# Patient Record
Sex: FEMALE | Race: OTHER | Employment: FULL TIME | URBAN - METROPOLITAN AREA
[De-identification: names, ages, dates, MRNs, and addresses within clinical notes are randomized per-mention and may not be internally consistent; named-entity substitution may affect disease eponyms.]

---

## 2021-07-20 PROBLEM — G56.02 CARPAL TUNNEL SYNDROME OF LEFT WRIST: Status: ACTIVE | Noted: 2021-07-20

## 2021-07-22 ENCOUNTER — ORDER TRANSCRIPTION (OUTPATIENT)
Dept: ADMINISTRATIVE | Facility: HOSPITAL | Age: 41
End: 2021-07-22

## 2021-07-22 DIAGNOSIS — G56.02 CARPAL TUNNEL SYNDROME OF LEFT WRIST: Primary | ICD-10-CM

## 2021-08-09 ENCOUNTER — PROCEDURE VISIT (OUTPATIENT)
Dept: NEUROLOGY | Facility: CLINIC | Age: 41
End: 2021-08-09
Payer: COMMERCIAL

## 2021-08-09 PROCEDURE — 95886 MUSC TEST DONE W/N TEST COMP: CPT | Performed by: OTHER

## 2021-08-09 PROCEDURE — 95909 NRV CNDJ TST 5-6 STUDIES: CPT | Performed by: OTHER

## 2021-08-09 NOTE — PROGRESS NOTES
[address] [place] [address]  Phone: [phone]  Fax: [phone] REPORT          Patient: Sienna Patel Hand Dominance:  right   Patient ID: [id number] Referring Dr:  Shreya Varma needle EMG examination was performed in 6 muscles. It was normal in 4 muscle(s): L. Deltoid, L. Biceps brachii, L. Triceps brachii, L. Cervical paraspinals.  The study was abnormal in 2 muscle(s), with the following distribution:  • Abnormal spontaneous/ins II 2.03 2.71 64.9 99.2 Mid palm - Dig II 7 34      Wrist Dig II 2.14 2.76 65.6 98.5 Wrist - Dig II 13 61       EMG Summary Table     Spontaneous MUAP Recruitment   Muscle Nerve Roots IA Fib PSW Fasc H.F. Comments Amp Dur. PPP Pattern   L.  Deltoid Axillary

## 2023-03-14 ENCOUNTER — EXTERNAL RECORD (OUTPATIENT)
Dept: HEALTH INFORMATION MANAGEMENT | Facility: OTHER | Age: 43
End: 2023-03-14

## 2023-03-22 ENCOUNTER — TELEPHONE (OUTPATIENT)
Dept: NEUROLOGY | Age: 43
End: 2023-03-22

## 2023-03-24 ENCOUNTER — OFFICE VISIT (OUTPATIENT)
Dept: NEUROLOGY | Age: 43
End: 2023-03-24

## 2023-03-24 VITALS
BODY MASS INDEX: 28.98 KG/M2 | DIASTOLIC BLOOD PRESSURE: 80 MMHG | WEIGHT: 169.75 LBS | OXYGEN SATURATION: 100 % | HEART RATE: 74 BPM | SYSTOLIC BLOOD PRESSURE: 120 MMHG | RESPIRATION RATE: 16 BRPM | HEIGHT: 64 IN

## 2023-03-24 DIAGNOSIS — M54.12 CERVICAL RADICULOPATHY AT C6: ICD-10-CM

## 2023-03-24 DIAGNOSIS — M54.16 RIGHT LUMBAR RADICULOPATHY: Primary | ICD-10-CM

## 2023-03-24 PROCEDURE — 99205 OFFICE O/P NEW HI 60 MIN: CPT | Performed by: PSYCHIATRY & NEUROLOGY

## 2023-03-24 RX ORDER — IBUPROFEN 600 MG/1
TABLET ORAL
COMMUNITY
Start: 2023-03-11 | End: 2023-03-24 | Stop reason: HOSPADM

## 2023-03-24 RX ORDER — LEVOTHYROXINE SODIUM 0.03 MG/1
TABLET ORAL DAILY
COMMUNITY
End: 2023-03-24 | Stop reason: HOSPADM

## 2023-03-24 RX ORDER — PREGABALIN 50 MG/1
50 CAPSULE ORAL AT BEDTIME
COMMUNITY
Start: 2023-03-16

## 2023-03-24 RX ORDER — LEVOTHYROXINE SODIUM 0.05 MG/1
TABLET ORAL
COMMUNITY
Start: 2023-03-02

## 2023-03-24 ASSESSMENT — PAIN SCALES - GENERAL: PAINLEVEL: 8

## 2023-03-25 PROBLEM — M54.16 RIGHT LUMBAR RADICULOPATHY: Status: ACTIVE | Noted: 2023-03-25

## 2023-03-25 PROBLEM — M54.12 CERVICAL RADICULOPATHY AT C6: Status: ACTIVE | Noted: 2023-03-25

## 2023-03-27 ENCOUNTER — TELEPHONE (OUTPATIENT)
Dept: NEUROLOGY | Age: 43
End: 2023-03-27